# Patient Record
Sex: FEMALE | URBAN - METROPOLITAN AREA
[De-identification: names, ages, dates, MRNs, and addresses within clinical notes are randomized per-mention and may not be internally consistent; named-entity substitution may affect disease eponyms.]

---

## 2023-03-29 ENCOUNTER — ALLIED HEALTH/NURSE VISIT (OUTPATIENT)
Dept: RESEARCH | Facility: CLINIC | Age: 28
End: 2023-03-29

## 2023-03-29 VITALS
RESPIRATION RATE: 16 BRPM | BODY MASS INDEX: 24.84 KG/M2 | WEIGHT: 135 LBS | HEART RATE: 75 BPM | SYSTOLIC BLOOD PRESSURE: 113 MMHG | OXYGEN SATURATION: 97 % | DIASTOLIC BLOOD PRESSURE: 73 MMHG | HEIGHT: 62 IN

## 2023-03-29 DIAGNOSIS — Z00.6 EXAMINATION OF PARTICIPANT OR CONTROL IN CLINICAL RESEARCH: Primary | ICD-10-CM

## 2023-03-29 PROCEDURE — 99207 PR NO CHARGE NURSE ONLY: CPT

## 2023-03-29 NOTE — PROGRESS NOTES
[facility] Study Consent    Study Description: The purpose of this study is to collect sleep data for product research and development. We will compare the performance of the Smartwatch to a medical-grade Home Sleep Test (HST). We hope to learn whether the Smartwatch can be used to track sleep quality at home.        Matilde Chang a 27 year old female, was on-site  today to discuss participation in the [facility] sleep data collection study.     The consent form was reviewed with the patient.     The review of the study included:    Study Purpose     COVID-19 Criteria     Participant Responsibilities      Study Data and Devices    Benefits and Risks of Participation    Compensation and Costs of Participation    Voluntary Participation    Confidentiality     Injury and Legal Rights    Protocol Version: 3.0    The subject was queried in regards to her willingness to continue and her questions were answered to her satisfaction.     The patient has given her agreement to volunteer and participate in the above noted study.     The Consent  and HIPAA form version 4.0 28-FEB-2023 was signed at 09:14  on  29-MAR-2023 with the Clinical Research Unit of [facility].     A copy of the [facility] consent will be placed in subject's medical record. A copy of the consent form was given to the subject today.    Study data is directly entered into Epic and [employer] per protocol.     No study procedures were done prior to Matilde Chang providing informed consent.       29-MAR-2023    Karla Turner

## 2023-03-29 NOTE — PROGRESS NOTES
"  [place] Sleep Study Inclusion/Exclusion Criteria:    Study Name: [place]  : María Frausto MD    Study Description: The purpose of this study is to collect sleep data for product research and development. We will compare the performance of the Smartwatch to a medical-grade Home Sleep Test (HST). We hope to learn whether the Smartwatch can be used to track sleep quality at home.    Protocol Version: 3.0  22-DEC-2022     Criteria #  Inclusion Criteria (ALL MUST BE YES)  YES/NO/N/A   1  Adult (age > 18 years old) Yes   2  Subject has a reliable WiFi network (examples of \"reliable\": able to video-conference call with a clear image, able to stream movies or video without interruption, play online computer games) Yes   3  Subject has access to a computer or smartphone with audiovisual capabilities (e.g., webcam and microphone/speaker*)  Yes   4  Subject is willing to comply with the study procedures    Yes         * applicable for subjects that are remotely consented and/or trained.     Criteria # Exclusion Criteria (ALL MUST BE NO) YES/NO/N/A   1  Active COVID infection   No   2  Decisionally impaired participants (no surrogate consenting is allowed)    No   3  Not understanding written and spoken English     No   4  Open wounds(s) on the wrist and/or forearm (in area where Smartwatch would be worn) No   5  Tattoos, large moles or scars on the dorsal aspect of wrist where the Smartwatch would be worn; individuals with tattoo on only one wrist may participate, if Smartwatch is worn on non-tattooed wrist    No   6  Known sensitivity or allergy to component of the Smartwatch   No   7  Planned travel across 2 or more times zones during study participation   No   8  Planned travel away from home for more than 5 days during the study period No   9  Overnight rotating shift work     No   10  Active and adherent to treatment for sleep apnea   (e.g., CPAP, dental appliance, Hypoglossal nerve stimulator)    " No   11  Plans to start treatment for apnea within the study timeframe    No   12  Overnight supplemental oxygen use   No   13    Employed by a company that develops or sells medical and/or fitness devices (e.g., ECG monitors, wearable fitness bands, sleep monitors, etc.) or are technology journalists (e.g., professional bloggers, TV, magazine, newspaper reporters, etc.) No   14    Participated in a previous sleep study that used a wrist-worn sensor device by same sponsor  No     Patient does fulfill study inclusion criteria and no exclusion criteria are found.     María Frausto MD    29-MAR-2023    Karla Turner

## 2023-03-29 NOTE — PROGRESS NOTES
"[facility] In-Person Study Note    Study Description: The purpose of this study is to collect sleep data for product research and development. We will compare the performance of the Smartwatch to a medical-grade Home Sleep Test (HST). We hope to learn whether the Smartwatch can be used to track sleep quality at home.       Subject ID:      SCREENING     Demographic Info  Matilde Chang   1995          27 year old  female    Race: White  Ethnicity: Non-/     Ayala Scale: OBAN Ayala: 2    Medical History:  Anxiety  Sleep Apnea Diagnosis: No    Current Medications:  Propranolol Indication: Anxiety 40 mg every day Start: 11/16/22; Ongoing  Late : Start date of this medication updated to reflect 2022 rather than 2023, which is more accurate. Start date confirmed by review of the EMR.    Vitals:  Time Subject Sat: 0900   /73 (BP Location: Left arm, Patient Position: Sitting, Cuff Size: Adult Regular)   Pulse 75   Resp 16   Ht 1.575 m (5' 2\")   Wt 61.2 kg (135 lb)   SpO2 97%   BMI 24.69 kg/m         Lifestyle:  Occupation: Chiropractor     Do you have a Bed Partner/Co-Sleeper? Yes   Previous Sleep Study?: No       (If Yes) Initial AHI Score: N/A    Alcohol Use: 2 drinks/week  Smoking History: No      Measurements & Preferences:  Dominant Hand: Right   Preferred Watch Wrist: Right     Left Wrist:  Circumference (mm): 149   Hairiness: A: Thin Hair, Low Density   Tattoos, Moles, Scars? None    Right Wrist:   Circumference (mm): 148   Hairiness: A: Thin Hair, Low Density   Tattoos, Moles, Scars? None    Was data collected?: Yes    ENROLLMENT & DEVICES      Device IDs:  Watch ID: [id number]    Watch Size: 40 mm   Band Size: S/M  Natural Notch: 5   Secure Notch: 5   Watch Setting Worn: 5   Phone ID: [id number]   Nox ID: [phone]   Has the Subject Enrolled in the Study [place]: Yes   Confirmation of Enrollment?: Yes   Enrollment Date: 29-MAR-2023  Smartwatch Training " Completed? Yes   Smartwatch Training Date: 29-MAR-2023  HSAT Training Completed? Yes   HSAT Training Date: 29-MAR-2023    29-MAR-2023  Karla Turner

## 2023-04-05 ENCOUNTER — ALLIED HEALTH/NURSE VISIT (OUTPATIENT)
Dept: RESEARCH | Facility: CLINIC | Age: 28
End: 2023-04-05

## 2023-04-05 DIAGNOSIS — Z00.6 EXAMINATION OF PARTICIPANT OR CONTROL IN CLINICAL RESEARCH: Primary | ICD-10-CM

## 2023-04-05 PROCEDURE — 99207 PR NO CHARGE NURSE ONLY: CPT

## 2023-04-05 NOTE — PROGRESS NOTES
[place] HSAT Kit Return  Study Description: The purpose of this study is to collect sleep data for product research and development. We will compare the performance of the Smartwatch to a medical-grade Home Sleep Test (HST). We hope to learn whether the Smartwatch can be used to track sleep quality at home.      Subject Number:     Study Day: Day 7    Tracking Number: N/A    Compliance Questions:  Did you wear a T-Shirt over the heart monitor? No   Did you double tape device tubing/wires?No   Did you turn-off your heart monitor each morning after collection?Yes  Did all equipment function correctly and stay-on throughout night?Yes  Did you see the red light underneath the watch turn-on both nights?Yes      Participant returned HSAT kit with all devices returned. Participant verbalized understanding that if their data is unusable per sponsor, they will conduct one additional set of HSAT recordings. All other questions/concerns addressed.     Adverse Events & Con Med Assessment Performed?   [x]     (If yes, please generate adverse event report for PI to cosign)    5-APR-2023    Karla Turner

## 2023-04-11 ENCOUNTER — TELEPHONE (OUTPATIENT)
Dept: RESEARCH | Facility: CLINIC | Age: 28
End: 2023-04-11

## 2023-04-11 NOTE — TELEPHONE ENCOUNTER
[facility] HSAT Results Phone Call  Study Description: The purpose of this study is to collect sleep data for product research and development. We will compare the performance of the Smartwatch to a medical-grade Home Sleep Test (HST). We hope to learn whether the Smartwatch can be used to track sleep quality at home.      Matilde Chang was called today to review results of her Home Sleep Test (HSAT).     A voicemail was left with the significance of the results. I also informed them that a copy of the results are available in their chart for review by their provider.     Additional Comments: HR is  with duration over 100 bpm was 0.6- 0.8 and duration over 90 bpm was up to 5.7 minutes     AHI Score: AHI less than 5  0.1, 0.0, 0.3, 0.0     Results:  NIGHT 1  Was it scorable?: Yes     Channel Presence: Scored and all 6 channels present  Select Missing Channels: N/A     (If applicable)      Analysis Start Date: 4/2/2023   Analysis Duration: 7hr 18min  Analysis Start Time: 11:06 pm        Analysis Stop Time: 6:24 am   Est Total Sleep Time: 7 hr 18 min      NIGHT 1 Scorer 1 Scorer 2   Respiratory Parameters   Apnea + Hypopneas (AH)   Total    0.1 /h   0 /h   Supine    0.3 /h   0 /h   Non-Supine   0 /h   0 /h   Count   1    0        Total Total    Obstructive (OA)    0 /h   0 /h   Hypopneas    0 /h   0 /h   Central Apnea Hypopnea (CA+CH)    0.1 /h   0 /h   Oxygen Saturation (SpO2)   Oxygen Desaturation Index (NICOLE)    0.1 /h   0.1 /h   Minimum SpO2    92 %   92 %   SpO2 Duration < 88%    0 %   0 %   Average Desat Drop    5 %   5 %   Position & Analysis Time   Supine (in TST) Duration    202.7 min   202.7 min       NIGHT 2  Was it scorable?: Yes (If no, delete the table below)     Channel Presence: Scored and all 6 channels present  Select Missing Channels: N/A     (If applicable)    Analysis Start Date: 4/3/2023   Analysis Duration: 7hr 8min  Analysis Start Time: 11:12 pm        Analysis Stop Time: 6:20 am     Est Total Sleep Time: 7h 7m        NIGHT 2 Scorer 1 Scorer 2   Respiratory Parameters   Apnea + Hypopneas (AH)   Total    0.3 /h   0 /h   Supine    0.4 /h   0 /h   Non-Supine   0 /h   0 /h   Count   2    0        Total Total    Obstructive (OA)    0 /h   0 /h   Hypopneas    0.1 /h   0 /h   Central Apnea Hypopnea (CA+CH)    0.1 /h   0 /h   Oxygen Saturation (SpO2)   Oxygen Desaturation Index (NICOLE)    0.1 /h   0.1 /h   Minimum SpO2    89 %   92 %   SpO2 Duration < 88%    0 %   0 %   Average Desat Drop    4 %   4 %   Position & Analysis Time   Supine (in TST) Duration    311.4 min   311.4 min       11-APR-2023    Steffany Tiwari NP

## 2023-04-12 ENCOUNTER — VIRTUAL VISIT (OUTPATIENT)
Dept: RESEARCH | Facility: CLINIC | Age: 28
End: 2023-04-12

## 2023-04-12 DIAGNOSIS — Z00.6 EXAMINATION OF PARTICIPANT OR CONTROL IN CLINICAL RESEARCH: Primary | ICD-10-CM

## 2023-04-12 PROCEDURE — 99207 PR NO CHARGE NURSE ONLY: CPT

## 2023-04-12 NOTE — PROGRESS NOTES
" [place] Study Phone Visit    Study Description: The purpose of this study is to collect sleep data for product research and development. We will compare the performance of the Smartwatch to a medical-grade Home Sleep Test (HST). We hope to learn whether the Smartwatch can be used to track sleep quality at home.      Subject Number:     Study Day: Week 3    [place] Study Subject was called today to:    Review Compliance     Answer subject questions    Deliver study protocol reminders to subject    Reminders Checklist:   [x]  Connected to WiFi  [x]  Completing both morning and evening surveys  [x]  Watch and Phone on  near each other after completed morning survey   [x]  Take devices off before showering/getting them wet  [x]  Make sure to dismiss any update notifications. -Tap \"Not Now\"  [x]  Good HSAT   [x]  Remind them of next appointment with us     (Applicable during last 4 days)  [x]  NO WATCH WEAR JUST SURVEY     Adverse Events & Con Med Assessment Performed?   [x]     (If yes, please generate adverse event report for PI to cosign)      12-APR-2023    Sara Behmanesh    "

## 2023-04-19 ENCOUNTER — VIRTUAL VISIT (OUTPATIENT)
Dept: RESEARCH | Facility: CLINIC | Age: 28
End: 2023-04-19

## 2023-04-19 DIAGNOSIS — Z00.6 RESEARCH SUBJECT: Primary | ICD-10-CM

## 2023-04-19 PROCEDURE — 99207 PR NO CHARGE NURSE ONLY: CPT

## 2023-04-19 NOTE — PROGRESS NOTES
" [place] Study Phone Visit    Study Description: The purpose of this study is to collect sleep data for product research and development. We will compare the performance of the Smartwatch to a medical-grade Home Sleep Test (HST). We hope to learn whether the Smartwatch can be used to track sleep quality at home.      Subject Number:     Study Day: Week 4    [place] Study Subject was called today to:    Review Compliance     Answer subject questions    Deliver study protocol reminders to subject    Reminders Checklist:   [x]  Connected to WiFi  [x]  Completing both morning and evening surveys  [x]  Watch and Phone on  near each other after completed morning survey   [x]  Take devices off before showering/getting them wet  [x]  Make sure to dismiss any update notifications. -Tap \"Not Now\"  [x]  Good HSAT   [x]  Remind them of next appointment with us     (Applicable during last 4 days)  [x]  NO WATCH WEAR JUST SURVEY     Adverse Events & Con Med Assessment Performed?   [x] None    (If yes, please generate adverse event report for PI to reyna)      19-APR-2023    Sloane Berg RN    "

## 2023-04-26 ENCOUNTER — VIRTUAL VISIT (OUTPATIENT)
Dept: RESEARCH | Facility: CLINIC | Age: 28
End: 2023-04-26

## 2023-04-26 DIAGNOSIS — Z00.6 RESEARCH SUBJECT: Primary | ICD-10-CM

## 2023-04-26 PROCEDURE — 99207 PR NO CHARGE NURSE ONLY: CPT

## 2023-04-26 NOTE — PROGRESS NOTES
" [place] Study Phone Visit    Study Description: The purpose of this study is to collect sleep data for product research and development. We will compare the performance of the Smartwatch to a medical-grade Home Sleep Test (HST). We hope to learn whether the Smartwatch can be used to track sleep quality at home.      Subject Number:     Study Day: Week 5    [place] Study Subject was called today to:    Review Compliance     Answer subject questions    Deliver study protocol reminders to subject    Reminders Checklist:   [x]  Connected to WiFi  [x]  Completing both morning and evening surveys  [x]  Watch and Phone on  near each other after completed morning survey   [x]  Take devices off before showering/getting them wet  [x]  Make sure to dismiss any update notifications. -Tap \"Not Now\"  [x]  Remind them of next appointment with us     (Applicable during last 4 days)  [x]  NO WATCH WEAR JUST SURVEY     Adverse Events & Con Med Assessment Performed?   [x]     (If yes, please generate adverse event report for PI to cosign)      26-APR-2023    Karla Turner      "

## 2023-05-03 ENCOUNTER — ALLIED HEALTH/NURSE VISIT (OUTPATIENT)
Dept: RESEARCH | Facility: CLINIC | Age: 28
End: 2023-05-03

## 2023-05-03 DIAGNOSIS — Z00.6 RESEARCH SUBJECT: Primary | ICD-10-CM

## 2023-05-03 PROCEDURE — 99207 PR NO CHARGE NURSE ONLY: CPT

## 2023-05-03 NOTE — PROGRESS NOTES
[place] Study End Note    Study Description: The purpose of this study is to collect sleep data for product research and development. We will compare the performance of the Smartwatch to a medical-grade Home Sleep Test (HST). We hope to learn whether the Smartwatch can be used to track sleep quality at home.        Study Termination/Completion Date: 3-MAY-2023  Reason for Termination (If Applicable): Completed     Subject returned all study devices today and this completes this study for the subject.    Adverse Events & Con Med Assessment Performed?   [x]       3-MAY-2023    Eris Gore